# Patient Record
Sex: MALE | Race: ASIAN | Employment: FULL TIME | URBAN - METROPOLITAN AREA
[De-identification: names, ages, dates, MRNs, and addresses within clinical notes are randomized per-mention and may not be internally consistent; named-entity substitution may affect disease eponyms.]

---

## 2017-10-20 ENCOUNTER — HOSPITAL ENCOUNTER (OUTPATIENT)
Dept: DIABETES SERVICES | Age: 37
Discharge: HOME | End: 2017-10-20
Payer: COMMERCIAL

## 2017-10-20 PROCEDURE — G0109 DIAB MANAGE TRN IND/GROUP: HCPCS | Performed by: DIETITIAN, REGISTERED

## 2017-11-02 ENCOUNTER — HOSPITAL ENCOUNTER (OUTPATIENT)
Dept: DIABETES SERVICES | Age: 37
Discharge: HOME | End: 2017-11-02
Payer: COMMERCIAL

## 2017-11-02 DIAGNOSIS — E11.9 DIABETES MELLITUS WITHOUT COMPLICATION (HCC): ICD-10-CM

## 2017-11-02 PROCEDURE — G0109 DIAB MANAGE TRN IND/GROUP: HCPCS | Performed by: DIETITIAN, REGISTERED

## 2017-12-19 ENCOUNTER — HOSPITAL ENCOUNTER (OUTPATIENT)
Dept: DIABETES SERVICES | Age: 37
Discharge: HOME | End: 2017-12-19
Payer: COMMERCIAL

## 2017-12-19 DIAGNOSIS — E11.9 DIABETES MELLITUS WITHOUT COMPLICATION (HCC): ICD-10-CM

## 2017-12-19 PROCEDURE — G0109 DIAB MANAGE TRN IND/GROUP: HCPCS | Performed by: DIETITIAN, REGISTERED

## 2021-12-10 ENCOUNTER — OFFICE VISIT (OUTPATIENT)
Dept: HEMATOLOGY | Age: 41
End: 2021-12-10
Payer: COMMERCIAL

## 2021-12-10 VITALS
RESPIRATION RATE: 16 BRPM | TEMPERATURE: 97.3 F | DIASTOLIC BLOOD PRESSURE: 76 MMHG | OXYGEN SATURATION: 99 % | HEIGHT: 70 IN | HEART RATE: 80 BPM | WEIGHT: 178.8 LBS | SYSTOLIC BLOOD PRESSURE: 115 MMHG | BODY MASS INDEX: 25.6 KG/M2

## 2021-12-10 DIAGNOSIS — R74.8 ELEVATED LIVER ENZYMES: Primary | ICD-10-CM

## 2021-12-10 PROBLEM — G62.9 NEUROPATHY: Status: ACTIVE | Noted: 2021-12-10

## 2021-12-10 PROBLEM — I10 HYPERTENSION: Status: ACTIVE | Noted: 2021-12-10

## 2021-12-10 PROBLEM — E78.00 HYPERCHOLESTEROLEMIA: Status: ACTIVE | Noted: 2021-12-10

## 2021-12-10 PROBLEM — E11.9 TYPE II DIABETES MELLITUS (HCC): Status: ACTIVE | Noted: 2021-12-10

## 2021-12-10 PROCEDURE — 99203 OFFICE O/P NEW LOW 30 MIN: CPT | Performed by: INTERNAL MEDICINE

## 2021-12-10 PROCEDURE — 91200 LIVER ELASTOGRAPHY: CPT | Performed by: INTERNAL MEDICINE

## 2021-12-10 RX ORDER — METFORMIN HYDROCHLORIDE 1000 MG/1
500 TABLET ORAL 2 TIMES DAILY
COMMUNITY
Start: 2021-11-29

## 2021-12-10 RX ORDER — LISINOPRIL 10 MG/1
10 TABLET ORAL DAILY
COMMUNITY
Start: 2021-10-26

## 2021-12-10 RX ORDER — ROSUVASTATIN AND EZETIMIBE 10; 10.4 MG/1; MG/1
TABLET ORAL
COMMUNITY
Start: 2021-11-17 | End: 2021-12-10

## 2021-12-10 RX ORDER — ROSUVASTATIN CALCIUM 10 MG/1
10 TABLET, COATED ORAL DAILY
COMMUNITY
Start: 2021-11-17

## 2021-12-10 RX ORDER — GLIMEPIRIDE 4 MG/1
4 TABLET ORAL DAILY
COMMUNITY
Start: 2021-11-03

## 2021-12-10 RX ORDER — FLASH GLUCOSE SENSOR
KIT MISCELLANEOUS
COMMUNITY
Start: 2021-11-26 | End: 2021-12-10

## 2021-12-10 RX ORDER — FLASH GLUCOSE SENSOR
KIT MISCELLANEOUS AS DIRECTED
COMMUNITY
Start: 2021-11-29

## 2021-12-10 RX ORDER — GABAPENTIN 300 MG/1
300 CAPSULE ORAL 2 TIMES DAILY
COMMUNITY
Start: 2021-11-25

## 2021-12-10 RX ORDER — GLIMEPIRIDE 2 MG/1
TABLET ORAL
COMMUNITY
Start: 2021-11-17 | End: 2021-12-10

## 2021-12-10 NOTE — Clinical Note
12/23/2021    Patient: Mendy Lundberg   YOB: 1980   Date of Visit: 12/10/2021     Aristeo Topete MD  [address] [place] [address]  Via Fax: [phone]    Dear Aristeo Topete MD,      Thank you for referring Mr. Mendy Lundberg to [facility] Ubaldo Rodriguez for evaluation. My notes for this consultation are attached. If you have questions, please do not hesitate to call me. I look forward to following your patient along with you.       Sincerely,    Andria Piña MD

## 2021-12-10 NOTE — PROGRESS NOTES
[address] [facility]      Geri Sharpe MD, Yudith Rocky, Tommie Albright MD, MPH      Jenae Espinoza, VITO Rice, ACN-BC     Olga Pryor, [facility]-BC   Aditya Christiano, FNP-EDDIE Dickerson, [facility]       Josekingsley Long [address]    at [address], [address] [place] [address], [facility] 22.    [phone]    FAX: [address] [place] [address] Drive, [address], [address]    [phone]    FAX: [phone]       Patient Care Team:  None as PCP - Ary Castellano MD (Endocrinology)  Herminia Key MD (Gastroenterology)      Problem List  Date Reviewed: 12/10/2021          Codes Class Noted    Elevated liver enzymes ICD-10-CM: R74.8  ICD-9-CM: 790.5  12/10/2021        Type II diabetes mellitus ([facility] ICD-10-CM: E11.9  ICD-9-CM: 250.00  12/10/2021        Hypercholesterolemia ICD-10-CM: E78.00  ICD-9-CM: 272.0  12/10/2021        Hypertension ICD-10-CM: I10  ICD-9-CM: 401.9  12/10/2021        Neuropathy ICD-10-CM: G62.9  ICD-9-CM: 355.9  12/10/2021              The clinicians listed above have asked me to see Ludin Durham in consultation regarding elevated liver enzymes and its management. All medical records sent by the referring physicians were reviewed including imaging studies     The patient is a 37 y.o. [facility] male who was found to have elevated liver transaminases and alkaline phosphatase in 12/2019. Serologic evaluation for markers of chronic liver disease was negative for HCV, HBV,     Ultrasound of the liver was performed in 12/2021. The results of the imaging are not available at this time. The patient believes this suggested fatty liver disease. Assessment of liver fibrosis with Fibroscan was performed in the office today. The result was 9.3 kPa which correlates with stage 2 fibrosis    The patient does not have any symptoms which could be attributed to the liver disorder. The patient is not experiencing the following symptoms which are commonly seen in this liver disorder:   fatigue,   pain in the right side over the liver,     The patient completes all daily activities without any functional limitations. ASSESSMENT AND PLAN:  Elevated liver enzymes  Persistent elevation in liver transaminases and alkaline phosphatase of unclear etiology at this time. Have performed laboratory testing to monitor liver function and degree of liver injury. This included BMP, hepatic panel, CBC with platelet count, INR. Liver transaminases are elevated. ALP is elevated. Liver function is normal.  The platelet count is normal.      Based upon laboratory studies Fibroscan, and imaging the patient may have significant liver injury. Serologic testing for causes of chronic liver disease was ordered. All was negative     The most likely causes for the liver chemistry abnormalities were discussed with the patient and include fatty liver disease,     The need to perform a liver biopsy to help determine the cause and severity of the liver test abnormalities was discussed. The risks of performing the liver biopsy including pain, puncture of the lung, gallbladder, intestine or kidney and bleeding were discussed. The patient has decided to have a liver biopsy. This will be scheduled. Screening for Hepatocellular Carcinoma  HCC screening is not necessary if the patient has no evidence of cirrhosis. Treatment of other medical problems in patients with chronic liver disease  There are no contraindications for the patient to take most medications that are necessary for treatment of other medical issues.     Counseling for alcohol in patients with chronic liver disease  The patient was counseled regarding alcohol consumption and the effect of alcohol on chronic liver disease. The patient does not consume any significant amount of alcohol. Vaccinations   Vaccination for viral hepatitis A is not needed. The patient has serologic evidence of prior exposure or vaccination with immunity. The patient has received 2 doses of COVID-19 vaccine. Routine vaccinations against other bacterial and viral agents can be performed as indicated. Annual flu vaccination should be administered if indicated. ALLERGIES  Allergies   Allergen Reactions    Semaglutide Other (comments)     bloating       MEDICATIONS  Current Outpatient Medications   Medication Sig    Trulicity 5.38 OC/0.2 mL sub-q pen INJECT 0.75 MG UNDER THE SKIN ONCE A WEEK    gabapentin (NEURONTIN) 300 mg capsule Take 300 mg by mouth two (2) times a day.  flash glucose sensor ([employer] Christie 14 Day Sensor) kit as directed.  glimepiride (AMARYL) 4 mg tablet Take 4 mg by mouth daily.  lisinopriL (PRINIVIL, ZESTRIL) 10 mg tablet Take 10 mg by mouth daily.  metFORMIN (GLUCOPHAGE) 1,000 mg tablet 500 mg two (2) times a day.  rosuvastatin (CRESTOR) 10 mg tablet Take 10 mg by mouth daily. No current facility-administered medications for this visit. SYSTEM REVIEW NOT RELATED TO LIVER DISEASE OR REVIEWED ABOVE:  Constitution systems: Negative for fever, chills, weight gain, weight loss. Eyes: Negative for visual changes. ENT: Negative for sore throat, painful swallowing. Respiratory: Negative for cough, hemoptysis, SOB. Cardiology: Negative for chest pain, palpitations. GI:  Negative for constipation or diarrhea. : Negative for urinary frequency, dysuria, hematuria, nocturia. Skin: Negative for rash. Hematology: Negative for easy bruising, blood clots. Musculo-skelatal: Negative for back pain, muscle pain, weakness. Neurologic: Negative for headaches, dizziness, vertigo, memory problems not related to HE.   Psychology: Negative for anxiety, depression. FAMILY HISTORY:  The father Has/had the following following chronic disease(s): DM, heart issues. .    The mother  of cancer. There is no family history of liver disease. SOCIAL HISTORY:  The patient is . The patient has 3 children,   The patient has never used tobacco products. The patient has never consumed significant amounts of alcohol. The patient currently works full time as software development. PHYSICAL EXAMINATION:  Visit Vitals  /76 (BP 1 Location: Left upper arm, BP Patient Position: Sitting, BP Cuff Size: Adult)   Pulse 80   Temp 97.3 °F (36.3 °C) (Temporal)   Resp 16   Ht 5' 9.6\" (1.768 m)   Wt 178 lb 12.8 oz (81.1 kg)   SpO2 99%   BMI 25.95 kg/m²     General: No acute distress. Eyes: Sclera anicteric. ENT: No oral lesions. Thyroid normal.  Nodes: No adenopathy. Skin: No spider angiomata. No jaundice. No palmar erythema. Respiratory: Lungs clear to auscultation. Cardiovascular: Regular heart rate. No murmurs. No JVD. Abdomen: Soft non-tender. Liver size normal to percussion/palpation. Spleen not palpable. No obvious ascites. Extremities: No edema. No muscle wasting. No gross arthritic changes. Neurologic: Alert and oriented. Cranial nerves grossly intact. No asterixis. LABORATORY STUDIES:  Liver [place] of [address] Units 12/10/2021   WBC 3.4 - 10.8 x10E3/uL 6.6   ANC 1.4 - 7.0 x10E3/uL 3.0   HGB 13.0 - 17.7 g/dL 15.5    - 450 x10E3/uL 284   INR 0.9 - 1.2 1.0   AST 0 - 40 IU/L 53 (H)   ALT 0 - 44 IU/L 89 (H)   Alk Phos 44 - 121 IU/L 125 (H)   Bili, Total 0.0 - 1.2 mg/dL 0.4   Bili, Direct 0.00 - 0.40 mg/dL 0.11   Albumin 4.0 - 5.0 g/dL 4.5   BUN 6 - 24 mg/dL 11   Creat 0.76 - 1.27 mg/dL 0.91   Na 134 - 144 mmol/L 139   K 3.5 - 5.2 mmol/L 5.3 (H)   Cl 96 - 106 mmol/L 101   CO2 20 - 29 mmol/L 26   Glucose 65 - 99 mg/dL 95     SEROLOGIES:  2020.   HBsurface antigen negative, anti-HCV negative    Serologies Latest Ref Rng & Units 12/10/2021   Hep A Ab, Total Negative Positive (A)   Hep B Core Ab, Total Negative Negative   Hep B Surface AB QL  Non Reactive   Ferritin 30 - 400 ng/mL 143   Iron % Saturation 15 - 55 % 25   LORRIE, IFA  Negative   C-ANCA Neg:<1:20 titer <1:20   P-ANCA Neg:<1:20 titer <1:20   ANCA Neg:<1:20 titer <1:20   ASMCA 0 - 19 Units 6   M2 Ab 0.0 - 20.0 Units <20.0   Ceruloplasmin 16.0 - 31.0 mg/dL 24.3   Alpha-1 antitrypsin level 95 - 164 mg/dL 128     LIVER HISTOLOGY:  12/2021. FibroScan performed at [address] EkPa was 9.3. IQR/med 33%. . The results suggested a fibrosis level of F2. The CAP score suggests there is no significant hepatic steatosis. ENDOSCOPIC PROCEDURES:  Not available or performed    RADIOLOGY:  Not available or performed    OTHER TESTING:  Not available or performed    FOLLOW-UP:  All of the issues listed above in the Assessment and Plan were discussed with the patient. All questions were answered. The patient expressed a clear understanding of the above. [address] [address] in 2 weeks after liver biopsy.       Onur Rodriguez MD  [id number], [address] [place] [address] 22.  [phone]  [address] [place] [address]

## 2021-12-10 NOTE — PROGRESS NOTES
Identified pt with two pt identifiers(name and ). Reviewed record in preparation for visit and have obtained necessary documentation. Chief Complaint   Patient presents with    New Patient     Establish care      Vitals:    12/10/21 1145   BP: 115/76   Pulse: 80   Resp: 16   Temp: 97.3 °F (36.3 °C)   TempSrc: Temporal   SpO2: 99%   Weight: 178 lb 12.8 oz (81.1 kg)   Height: 5' 9.6\" (1.768 m)   PainSc:   0 - No pain       Health Maintenance Review: Patient reminded of \"due or due soon\" health maintenance. I have asked the patient to contact his/her primary care provider (PCP) for follow-up on his/her health maintenance. Coordination of Care Questionnaire:  :   1) Have you been to an emergency room, urgent care, or hospitalized since your last visit? If yes, where when, and reason for visit? no       2. Have seen or consulted any other health care provider since your last visit? If yes, where when, and reason for visit? YES, Dr William Jacobs    Patient is accompanied by self I have received verbal consent from Kalina Riddle to discuss any/all medical information while they are present in the room.

## 2021-12-11 LAB
A1AT SERPL-MCNC: 128 MG/DL (ref 95–164)
ACTIN IGG SERPL-ACNC: 6 UNITS (ref 0–19)
ALBUMIN SERPL-MCNC: 4.5 G/DL (ref 4–5)
ALP SERPL-CCNC: 125 IU/L (ref 44–121)
ALT SERPL-CCNC: 89 IU/L (ref 0–44)
AST SERPL-CCNC: 53 IU/L (ref 0–40)
BASOPHILS # BLD AUTO: 0 X10E3/UL (ref 0–0.2)
BASOPHILS NFR BLD AUTO: 1 %
BILIRUB DIRECT SERPL-MCNC: 0.11 MG/DL (ref 0–0.4)
BILIRUB SERPL-MCNC: 0.4 MG/DL (ref 0–1.2)
BUN SERPL-MCNC: 11 MG/DL (ref 6–24)
BUN/CREAT SERPL: 12 (ref 9–20)
CALCIUM SERPL-MCNC: 9.5 MG/DL (ref 8.7–10.2)
CERULOPLASMIN SERPL-MCNC: 24.3 MG/DL (ref 16–31)
CHLORIDE SERPL-SCNC: 101 MMOL/L (ref 96–106)
CHOLEST SERPL-MCNC: 137 MG/DL (ref 100–199)
CO2 SERPL-SCNC: 26 MMOL/L (ref 20–29)
CREAT SERPL-MCNC: 0.91 MG/DL (ref 0.76–1.27)
EOSINOPHIL # BLD AUTO: 0.2 X10E3/UL (ref 0–0.4)
EOSINOPHIL NFR BLD AUTO: 4 %
ERYTHROCYTE [DISTWIDTH] IN BLOOD BY AUTOMATED COUNT: 13.2 % (ref 11.6–15.4)
EST. AVERAGE GLUCOSE BLD GHB EST-MCNC: 169 MG/DL
FERRITIN SERPL-MCNC: 143 NG/ML (ref 30–400)
GLUCOSE SERPL-MCNC: 95 MG/DL (ref 65–99)
HAV AB SER QL IA: POSITIVE
HBA1C MFR BLD: 7.5 % (ref 4.8–5.6)
HBV CORE AB SERPL QL IA: NEGATIVE
HBV SURFACE AB SER QL: NON REACTIVE
HCT VFR BLD AUTO: 47 % (ref 37.5–51)
HDLC SERPL-MCNC: 42 MG/DL
HGB BLD-MCNC: 15.5 G/DL (ref 13–17.7)
IMM GRANULOCYTES # BLD AUTO: 0 X10E3/UL (ref 0–0.1)
IMM GRANULOCYTES NFR BLD AUTO: 1 %
INR PPP: 1 (ref 0.9–1.2)
IRON SATN MFR SERPL: 25 % (ref 15–55)
IRON SERPL-MCNC: 85 UG/DL (ref 38–169)
LDLC SERPL CALC-MCNC: 78 MG/DL (ref 0–99)
LYMPHOCYTES # BLD AUTO: 2.7 X10E3/UL (ref 0.7–3.1)
LYMPHOCYTES NFR BLD AUTO: 40 %
MCH RBC QN AUTO: 27.5 PG (ref 26.6–33)
MCHC RBC AUTO-ENTMCNC: 33 G/DL (ref 31.5–35.7)
MCV RBC AUTO: 84 FL (ref 79–97)
MITOCHONDRIA M2 IGG SER-ACNC: <20 UNITS (ref 0–20)
MONOCYTES # BLD AUTO: 0.7 X10E3/UL (ref 0.1–0.9)
MONOCYTES NFR BLD AUTO: 10 %
NEUTROPHILS # BLD AUTO: 3 X10E3/UL (ref 1.4–7)
NEUTROPHILS NFR BLD AUTO: 44 %
PLATELET # BLD AUTO: 284 X10E3/UL (ref 150–450)
POTASSIUM SERPL-SCNC: 5.3 MMOL/L (ref 3.5–5.2)
PROT SERPL-MCNC: 7.6 G/DL (ref 6–8.5)
PROTHROMBIN TIME: 10.3 SEC (ref 9.1–12)
RBC # BLD AUTO: 5.63 X10E6/UL (ref 4.14–5.8)
SODIUM SERPL-SCNC: 139 MMOL/L (ref 134–144)
TIBC SERPL-MCNC: 341 UG/DL (ref 250–450)
TRIGL SERPL-MCNC: 86 MG/DL (ref 0–149)
UIBC SERPL-MCNC: 256 UG/DL (ref 111–343)
VLDLC SERPL CALC-MCNC: 17 MG/DL (ref 5–40)
WBC # BLD AUTO: 6.6 X10E3/UL (ref 3.4–10.8)

## 2021-12-13 LAB
C-ANCA TITR SER IF: NORMAL TITER
P-ANCA ATYPICAL TITR SER IF: NORMAL TITER
P-ANCA TITR SER IF: NORMAL TITER

## 2021-12-14 LAB — ANA TITR SER IF: NEGATIVE {TITER}

## 2022-01-19 ENCOUNTER — TRANSCRIBE ORDER (OUTPATIENT)
Dept: SCHEDULING | Age: 42
End: 2022-01-19

## 2022-01-19 DIAGNOSIS — R74.8 ELEVATED LIVER ENZYMES: Primary | ICD-10-CM

## 2022-01-21 ENCOUNTER — TRANSCRIBE ORDER (OUTPATIENT)
Dept: REGISTRATION | Age: 42
End: 2022-01-21

## 2022-01-21 ENCOUNTER — HOSPITAL ENCOUNTER (OUTPATIENT)
Dept: PREADMISSION TESTING | Age: 42
Discharge: HOME | End: 2022-01-21
Attending: INTERNAL MEDICINE
Payer: COMMERCIAL

## 2022-01-21 DIAGNOSIS — U07.1 COVID-19: ICD-10-CM

## 2022-01-21 DIAGNOSIS — U07.1 COVID-19: Primary | ICD-10-CM

## 2022-01-21 PROCEDURE — U0005 INFEC AGEN DETEC AMPLI PROBE: HCPCS

## 2022-01-23 LAB
SARS-COV-2, XPLCVT: NOT DETECTED
SOURCE, COVRS: NORMAL

## 2022-01-27 ENCOUNTER — HOSPITAL ENCOUNTER (OUTPATIENT)
Age: 42
Setting detail: OUTPATIENT SURGERY
Discharge: HOME | End: 2022-01-27
Attending: INTERNAL MEDICINE | Admitting: INTERNAL MEDICINE
Payer: COMMERCIAL

## 2022-01-27 ENCOUNTER — APPOINTMENT (OUTPATIENT)
Dept: ULTRASOUND IMAGING | Age: 42
End: 2022-01-27
Attending: INTERNAL MEDICINE
Payer: COMMERCIAL

## 2022-01-27 VITALS
HEIGHT: 70 IN | BODY MASS INDEX: 24.62 KG/M2 | RESPIRATION RATE: 13 BRPM | WEIGHT: 171.96 LBS | DIASTOLIC BLOOD PRESSURE: 91 MMHG | OXYGEN SATURATION: 97 % | TEMPERATURE: 98.7 F | HEART RATE: 76 BPM | SYSTOLIC BLOOD PRESSURE: 136 MMHG

## 2022-01-27 DIAGNOSIS — K76.0 NAFLD (NONALCOHOLIC FATTY LIVER DISEASE): ICD-10-CM

## 2022-01-27 DIAGNOSIS — R74.8 ELEVATED LIVER ENZYMES: ICD-10-CM

## 2022-01-27 PROCEDURE — 77030013826 HC NDL BIOP MAXCOR BARD -B: Performed by: INTERNAL MEDICINE

## 2022-01-27 PROCEDURE — 47000 NEEDLE BIOPSY OF LIVER PERQ: CPT | Performed by: INTERNAL MEDICINE

## 2022-01-27 PROCEDURE — 76942 ECHO GUIDE FOR BIOPSY: CPT | Performed by: INTERNAL MEDICINE

## 2022-01-27 PROCEDURE — 74011000250 HC RX REV CODE- 250: Performed by: INTERNAL MEDICINE

## 2022-01-27 PROCEDURE — 76942 ECHO GUIDE FOR BIOPSY: CPT

## 2022-01-27 PROCEDURE — 88313 SPECIAL STAINS GROUP 2: CPT

## 2022-01-27 PROCEDURE — 88307 TISSUE EXAM BY PATHOLOGIST: CPT

## 2022-01-27 PROCEDURE — 76040000019: Performed by: INTERNAL MEDICINE

## 2022-01-27 PROCEDURE — 77030014115: Performed by: INTERNAL MEDICINE

## 2022-01-27 RX ORDER — SODIUM CHLORIDE 0.9 % (FLUSH) 0.9 %
5-40 SYRINGE (ML) INJECTION EVERY 8 HOURS
Status: DISCONTINUED | OUTPATIENT
Start: 2022-01-27 | End: 2022-01-27 | Stop reason: HOSPADM

## 2022-01-27 RX ORDER — ONDANSETRON 2 MG/ML
4 INJECTION INTRAMUSCULAR; INTRAVENOUS
Status: DISCONTINUED | OUTPATIENT
Start: 2022-01-27 | End: 2022-01-27 | Stop reason: HOSPADM

## 2022-01-27 RX ORDER — FENTANYL CITRATE 50 UG/ML
25 INJECTION, SOLUTION INTRAMUSCULAR; INTRAVENOUS
Status: DISCONTINUED | OUTPATIENT
Start: 2022-01-27 | End: 2022-01-27 | Stop reason: HOSPADM

## 2022-01-27 RX ORDER — LIDOCAINE HYDROCHLORIDE 10 MG/ML
10 INJECTION INFILTRATION; PERINEURAL ONCE
Status: COMPLETED | OUTPATIENT
Start: 2022-01-27 | End: 2022-01-27

## 2022-01-27 RX ORDER — SODIUM CHLORIDE 0.9 % (FLUSH) 0.9 %
5-40 SYRINGE (ML) INJECTION AS NEEDED
Status: DISCONTINUED | OUTPATIENT
Start: 2022-01-27 | End: 2022-01-27 | Stop reason: HOSPADM

## 2022-01-27 NOTE — H&P
[facility], MD, [address], [place], [place]      Mil Crowley, VITO Larson, [facility]     Olga Pryor, [facility]   RACHNA Lockhart, [facility]       Jose Phillips [address]    at [address]    [address] [place] [address], [address] [place] [address], [facility] 22.    [phone]    FAX: [address] [place] [address] Avenue    [address], [address]    [phone]    FAX: [phone]         PRE-PROCEDURE NOTE - LIVER BIOPSY    H and P from last office visit reviewed. Allergies reviewed. Out-patient medication list reviewed. Patient Active Problem List   Diagnosis Code    Elevated liver enzymes R74.8    Type II diabetes mellitus ([facility] E11.9    Hypercholesterolemia E78.00    Hypertension I10    Neuropathy G62.9       Allergies   Allergen Reactions    Semaglutide Other (comments)     bloating       No current facility-administered medications on file prior to encounter. Current Outpatient Medications on File Prior to Encounter   Medication Sig Dispense Refill    Trulicity 4.56 TO/9.9 mL sub-q pen INJECT 0.75 MG UNDER THE SKIN ONCE A WEEK      gabapentin (NEURONTIN) 300 mg capsule Take 300 mg by mouth two (2) times a day.  flash glucose sensor ([employer] Christie 14 Day Sensor) kit as directed.  glimepiride (AMARYL) 4 mg tablet Take 4 mg by mouth daily.  lisinopriL (PRINIVIL, ZESTRIL) 10 mg tablet Take 10 mg by mouth daily.  metFORMIN (GLUCOPHAGE) 1,000 mg tablet 500 mg two (2) times a day.  rosuvastatin (CRESTOR) 10 mg tablet Take 10 mg by mouth daily. For liver biopsy to assess NALFD. The risks of the procedure were discussed with the patient. This included bleeding, pain, and puncture of other organs.   All questions were answered. The patient wishes to proceed with the procedure. The patient was counseled at length about the risks of regina Covid-19 in the chris-operative and post-operative states including the recovery window of their procedure. The patient was made aware that regina Covid-19 after a surgical procedure may worsen their prognosis for recovering from the virus and lend to a higher morbidity and or mortality risk. The patient was given the options of postponing their procedure. All of the risks, benefits, and alternatives were discussed. The patient does  wish to proceed with the procedure. PHYSICAL EXAMINATION:  Visit Vitals  BP (!) 132/91   Pulse 72   Temp 98.9 °F (37.2 °C)   Resp 16   Ht 5' 9.6\" (1.768 m)   Wt 171 lb 15.3 oz (78 kg)   SpO2 99%   BMI 24.96 kg/m²       General: No acute distress. Eyes: Sclera anicteric. ENT: No oral lesions. Thyroid normal.  Nodes: No adenopathy. Skin: No spider angiomata. No jaundice. No palmar erythema. Respiratory: Lungs clear to auscultation. Cardiovascular: Regular heart rate. No murmurs. No JVD. Abdomen: Soft non-tender, liver size normal to percussion/palpation. Spleen not palpable. No obvious ascites. Extremities: No edema. No muscle wasting. No gross arthritic changes. Neurologic: Alert and oriented. Cranial nerves grossly intact. No asterixis. LABS:  Lab Results   Component Value Date/Time    WBC 6.6 12/10/2021 01:01 PM    HGB 15.5 12/10/2021 01:01 PM    HCT 47.0 12/10/2021 01:01 PM    PLATELET 301 87/57/5495 01:01 PM    MCV 84 12/10/2021 01:01 PM     Lab Results   Component Value Date/Time    INR 1.0 12/10/2021 01:01 PM    Prothrombin time 10.3 12/10/2021 01:01 PM       ASSESSMENT AND PLAN:  Liver biopsy under ultrasound guidance.     Olivia Marin MD  [facility] of [address], [address] [place] [address] 22.  [phone]  [address] [place] [address]

## 2022-01-27 NOTE — DISCHARGE INSTRUCTIONS
[facility], [place], [place], [facility], [place]      VITO Centeno, [facility]-BC     April S Roya, [facility]   Jerald Saravia PENNY Stafford, [facility]       Jose Long [facility] 136    at [address] [address] [address] [facility], [address] [address]  22.    [phone]    FAX: [address] [place] [address] [address], [address]    [phone]    FAX: [phone]         LIVER BIOPSY DISCHARGE INSTRUCTIONS      Eliazar Velazquez  1980  Date: 1/27/2022    DIET:    Anjanarebekah Muñoz may resume your previous diet. ACTIVITIES:  Rest quietly the rest of today. You should not lift any objects more than 20 pounds for the next 2 days. If you work sitting down without strenuous activity you may return to work tomorrow. If you exert yourself or do heavy lifting at work you should take tomorrow off. Do not drive or operate hazardous machinery for 12 hours after you are discharged from this procedure. SPECIAL INSTRUCTIONS:  Do not use any aspirin or non-steroidal (Motin, Advil, Naproxen, etc) pain medications for the next 2 days. You may use extra-strength Tylenol (acetaminophen) if you experience pain or discomfort later today. Restarting blood thinners: If you were taking blood thinners prior to the procedure you can restart these in 2 days. Call the [facility] [place] office if you experience any of the following:  Persistent or severe abdominal pain. Persistent or severe abdominal distention. Fever and chills   Nausea and vomiting. New or unusual symptoms. Follow-up care: You should have a follow up appointment with Dr. Rosendo Lucio to review the results of the liver biopsy results in 2 weeks.   If you do not have an appointment please call the office at the number listed above to schedule this. Other instructions: If you have any problems or questions call the [facility] office at the phone number listed above. DISCHARGE SUMMARY from Nurse: The following personal items collected during your admission are returned to you:   Dental Appliance: Dental Appliances: None  Vision: Visual Aid: None  Hearing Aid:    Jewelry:    Clothing:    Other Valuables:    Valuables sent to safe:            Learning About Coronavirus (COVID-19)  Coronavirus (COVID-19): Overview  What is coronavirus ([id number])? The coronavirus disease (COVID-19) is caused by a virus. It is an illness that was first found in [place], [place], in December 2019. It has since spread worldwide. The virus can cause fever, cough, and trouble breathing. In severe cases, it can cause pneumonia and make it hard to breathe without help. It can cause death. Coronaviruses are a large group of viruses. They cause the common cold. They also cause more serious illnesses like Middle East respiratory syndrome (MERS) and severe acute respiratory syndrome (SARS). COVID-19 is caused by a novel coronavirus. That means it's a new type that has not been seen in people before. This virus spreads person-to-person through droplets from coughing and sneezing. It can also spread when you are close to someone who is infected. And it can spread when you touch something that has the virus on it, such as a doorknob or a tabletop. What can you do to protect yourself from coronavirus (COVID-19)? The best way to protect yourself from getting sick is to:  · Avoid areas where there is an outbreak. · Avoid contact with people who may be infected. · Wash your hands often with soap or alcohol-based hand sanitizers. · Avoid crowds and try to stay at least 6 feet away from other people. · Wash your hands often, especially after you cough or sneeze.  Use soap and water, and scrub for at least 20 seconds. If soap and water aren't available, use an alcohol-based hand . · Avoid touching your mouth, nose, and eyes. What can you do to avoid spreading the virus to others? To help avoid spreading the virus to others:  · Cover your mouth with a tissue when you cough or sneeze. Then throw the tissue in the trash. · Use a disinfectant to clean things that you touch often. · Stay home if you are sick or have been exposed to the virus. Don't go to school, work, or public areas. And don't use public transportation. · If you are sick:  ? Leave your home only if you need to get medical care. But call the doctor's office first so they know you're coming. And wear a face mask, if you have one.  ? If you have a face mask, wear it whenever you're around other people. It can help stop the spread of the virus when you cough or sneeze. ? Clean and disinfect your home every day. Use household  and disinfectant wipes or sprays. Take special care to clean things that you grab with your hands. These include doorknobs, remote controls, phones, and handles on your refrigerator and microwave. And don't forget countertops, tabletops, bathrooms, and computer keyboards. When to call for help  Call 911 anytime you think you may need emergency care. For example, call if:  · You have severe trouble breathing. (You can't talk at all.)  · You have constant chest pain or pressure. · You are severely dizzy or lightheaded. · You are confused or can't think clearly. · Your face and lips have a blue color. · You pass out (lose consciousness) or are very hard to wake up. Call your doctor now if you develop symptoms such as:  · Shortness of breath. · Fever. · Cough. If you need to get care, call ahead to the doctor's office for instructions before you go. Make sure you wear a face mask, if you have one, to prevent exposing other people to the virus. Where can you get the latest information?   The following [facility] organizations are tracking and studying this virus. Their websites contain the most up-to-date information. Dong Rinaldi also learn what to do if you think you may have been exposed to the virus. · U.S. Centers for Disease Control and Prevention (CDC): The CDC provides updated news about the disease and travel advice. The website also tells you how to prevent the spread of infection. www.cdc.gov  · World Health Organization [facility]): WHO offers information about the virus outbreaks. WHO also has travel advice. www.who.int  Current as of: April 1, 2020               Content Version: 12.4  © 8979-7570 [employer]orporated. Care instructions adapted under license by your healthcare professional. If you have questions about a medical condition or this instruction, always ask your healthcare professional. [address] any warranty or liability for your use of this information.

## 2022-01-27 NOTE — PROCEDURES
[facility], MD, [address], [place], [place]      VITO Finley, [facility] P[facility]or, [facility]   RACHNA Parikh, [facility]       Jose Long [facility] [address]    at [address], [facility] Jose Kang  22. [phone]    FAX: [address] [place] [address], [address]    [phone]    FAX: [phone]         LIVER BIOPSY PROCEDURE NOTE    [facility]  1980    INDICATIONS/PRE-OPERATIVE  DIAGNOSIS:  NAFLD    : Kristin Pollock MD    SURGICAL ASSISTANT:  None    PROSTHETIC DEVICES, TISSUE GRAFTS, TRANSPLANTED ORGANS:  Not applicable    SEDATION: 1% Lidocaine injection 10 ml    PROCEDURE:  Informed consent to perform the procedure was obtained from the patient. The patient was positioned on the edge of the stretcher lying flat in the supine position. Ultrasound was utilized to image the liver. The diaphragm and any major mass lesion or vascular structures within the liver were identified. An appropriate site for liver biopsy was identified. The distance from the surface of the skin to the liver capsule was 3 cm. This area was prepped with betadine and draped in sterile fashion. The skin was infiltrated with 1% lidocaine. The deeper subcutanous tissues and liver capsule overlying the biopsy site were then infiltrated with 1% lidocaine until appropriate anesthesia was obtained. A small incision was made in the skin so the biopsy devise could be easily inserted. A total of 2 passes with the 16 gauge Bard biopsy devise was then made into the liver. Core(s) of liver tissue totaling 4 cm in length were obtained and placed into tissue fixative.   A band aid was placed over the biopsy site. The patient was then repositioned on the right side and transported to the recovery area on the stretcher for routine monitoring until discharge. The specimen was sent to pathology for processing via the normal transport mechanism. SPECIMEN COLLECTED: Liver    INTERVENTIONS:  None    ESTIMATED BLOOD LOSS: Negligible.      COMPLICATIONS:  None    POST-OPERATIVE DIAGNOSIS: Same as Pre-operative Diagnosis      Jose Munguia MD  [address] 22.  [phone]  [address] [place] [address]

## 2022-02-10 ENCOUNTER — OFFICE VISIT (OUTPATIENT)
Dept: HEMATOLOGY | Age: 42
End: 2022-02-10
Payer: COMMERCIAL

## 2022-02-10 VITALS
TEMPERATURE: 96.8 F | RESPIRATION RATE: 16 BRPM | BODY MASS INDEX: 25.77 KG/M2 | WEIGHT: 180 LBS | SYSTOLIC BLOOD PRESSURE: 112 MMHG | DIASTOLIC BLOOD PRESSURE: 69 MMHG | HEART RATE: 83 BPM | OXYGEN SATURATION: 98 % | HEIGHT: 70 IN

## 2022-02-10 DIAGNOSIS — K75.81 NASH (NONALCOHOLIC STEATOHEPATITIS): Primary | ICD-10-CM

## 2022-02-10 PROCEDURE — 99214 OFFICE O/P EST MOD 30 MIN: CPT | Performed by: INTERNAL MEDICINE

## 2022-02-10 RX ORDER — TADALAFIL 5 MG/1
TABLET ORAL
COMMUNITY
Start: 2022-01-12 | End: 2022-02-27

## 2022-02-10 RX ORDER — TAMSULOSIN HYDROCHLORIDE 0.4 MG/1
CAPSULE ORAL
COMMUNITY
Start: 2021-12-08 | End: 2022-02-27

## 2022-02-10 RX ORDER — OMEPRAZOLE 40 MG/1
40 CAPSULE, DELAYED RELEASE ORAL DAILY
COMMUNITY
Start: 2021-12-08 | End: 2022-02-27

## 2022-02-10 NOTE — PROGRESS NOTES
Identified pt with two pt identifiers(name and ). Reviewed record in preparation for visit and have obtained necessary documentation. Chief Complaint   Patient presents with    Other     LBX  f/u      Vitals:    02/10/22 0804   BP: 112/69   Pulse: 83   Resp: 16   Temp: 96.8 °F (36 °C)   TempSrc: Temporal   SpO2: 98%   Weight: 180 lb (81.6 kg)   Height: 5' 9.6\" (1.768 m)   PainSc:   0 - No pain       Health Maintenance Review: Patient reminded of \"due or due soon\" health maintenance. I have asked the patient to contact his/her primary care provider (PCP) for follow-up on his/her health maintenance. Coordination of Care Questionnaire:  :   1) Have you been to an emergency room, urgent care, or hospitalized since your last visit? If yes, where when, and reason for visit? no       2. Have seen or consulted any other health care provider since your last visit? If yes, where when, and reason for visit? NO      Patient is accompanied by self I have received verbal consent from Yamile Sales to discuss any/all medical information while they are present in the room.

## 2022-02-10 NOTE — PROGRESS NOTES
Clemente Daniels [address]      Binta Bills MD, Tangela Persaud, Ventura Schafer MD, MPH      [place], PA-EDDIE Stafford, [facility]-BC     Olga Pryor, [facility]   Digna Bonilla GALO-EDDIE Brambila, [facility]       Jose Mois[facility] [address]    at [address], [facility] Jose Kang  22.    [phone]    FAX: [address] [place] [address], [address]    [phone]    FAX: [phone]       Patient Care Team:  None as PCP - Naya Tom MD (Endocrinology)  Lizzy Yu MD (Gastroenterology)      Problem List  Date Reviewed: 2/27/2022          Codes Class Noted    MORALES (nonalcoholic steatohepatitis) ICD-10-CM: K75.81  ICD-9-CM: 571.8  2/27/2022        Type II diabetes mellitus ([facility] ICD-10-CM: E11.9  ICD-9-CM: 250.00  12/10/2021        Hypercholesterolemia ICD-10-CM: E78.00  ICD-9-CM: 272.0  12/10/2021        Hypertension ICD-10-CM: I10  ICD-9-CM: 401.9  12/10/2021        Neuropathy ICD-10-CM: G62.9  ICD-9-CM: 355.9  12/10/2021              Beau Black is being seen at [address] for management of non-alcoholic steatohepatitis (MORALES). The active problem list, all pertinent past medical history, medications, liver histology, radiologic findings and laboratory findings related to the liver disorder were reviewed and discussed with the patient. The patient is a 37 y.o. [place] ([facility]) male who was found to have elevated liver transaminases and alkaline phosphatase in 12/2019. Serologic evaluation for markers of chronic liver disease was negative     The patient underwent a liver biopsy in 2/2022. The procedure was well tolerated. I have personally reviewed and interpreted the liver biopsy slides. This demonstrates MORALES with stage 2 fibrosis. The patient does not have any symptoms which could be attributed to the liver disorder. The patient is not experiencing the following symptoms which are commonly seen in this liver disorder:   fatigue,   pain in the right side over the liver,     The patient completes all daily activities without any functional limitations. ASSESSMENT AND PLAN:  MORALES  The diagnosis is based upon liver biopsy, Fiboscan CAP score, features of metabolic syndrome, serologic studies that are negative for other causes of chronic liver disease,     A liver biopsy performed in 2/2022 demonstrates moderate steatosis, moderate inflammation, mild ballooning, stage 2 fibrosis. Fibroscan in 12/2021 demonstrated  9.3 kPa and  suggesting stage 2 fibrosis. Liver transaminases are elevated. ALP is elevated. Liver function is normal.  The platelet count is normal.      The patient has a normal or near normal BMI and can not possibly loose sufficient weight to resolve NAFLD. There is currently no FDA approved medical treatment for fatty liver, NALFD or MORALES. The only medical treatments for MORALES are though clinical trials. The patient would like to participate in a clinical trial.    Screening for Hepatocellular Carcinoma  HCC screening is not necessary if the patient has no evidence of cirrhosis. Treatment of other medical problems in patients with chronic liver disease  There are no contraindications for the patient to take most medications that are necessary for treatment of other medical issues. Counseling for alcohol in patients with chronic liver disease  The patient was counseled regarding alcohol consumption and the effect of alcohol on chronic liver disease. The patient does not consume any significant amount of alcohol. Vaccinations   Vaccination for viral hepatitis A is not needed.   The patient has serologic evidence of prior exposure or vaccination with immunity. The patient has received 2 doses of COVID-19 vaccine. Routine vaccinations against other bacterial and viral agents can be performed as indicated. Annual flu vaccination should be administered if indicated. ALLERGIES  Allergies   Allergen Reactions    Semaglutide Other (comments)     bloating       MEDICATIONS  Current Outpatient Medications   Medication Sig    Trulicity 7.00 RV/5.0 mL sub-q pen INJECT 0.75 MG UNDER THE SKIN ONCE A WEEK    gabapentin (NEURONTIN) 300 mg capsule Take 300 mg by mouth two (2) times a day.  flash glucose sensor ([employer]upi[employer]Style Christie 14 Day Sensor) kit as directed.  glimepiride (AMARYL) 4 mg tablet Take 4 mg by mouth daily.  lisinopriL (PRINIVIL, ZESTRIL) 10 mg tablet Take 10 mg by mouth daily.  metFORMIN (GLUCOPHAGE) 1,000 mg tablet 500 mg two (2) times a day.  rosuvastatin (CRESTOR) 10 mg tablet Take 10 mg by mouth daily.  omeprazole (PRILOSEC) 40 mg capsule Take 40 mg by mouth daily. (Patient not taking: Reported on 2/10/2022)    tadalafiL (CIALIS) 5 mg tablet  (Patient not taking: Reported on 2/10/2022)    tamsulosin (FLOMAX) 0.4 mg capsule TAKE 1 CAPSULE BY MOUTH EVERY EVENING (Patient not taking: Reported on 2/10/2022)     No current facility-administered medications for this visit. SYSTEM REVIEW NOT RELATED TO LIVER DISEASE OR REVIEWED ABOVE:  Constitution systems: Negative for fever, chills, weight gain, weight loss. Eyes: Negative for visual changes. ENT: Negative for sore throat, painful swallowing. Respiratory: Negative for cough, hemoptysis, SOB. Cardiology: Negative for chest pain, palpitations. GI:  Negative for constipation or diarrhea. : Negative for urinary frequency, dysuria, hematuria, nocturia. Skin: Negative for rash. Hematology: Negative for easy bruising, blood clots. Musculo-skelatal: Negative for back pain, muscle pain, weakness.   Neurologic: Negative for headaches, dizziness, vertigo, memory problems not related to HE. Psychology: Negative for anxiety, depression. FAMILY HISTORY:  The father Has/had the following following chronic disease(s): DM, heart issues. .    The mother  of cancer. There is no family history of liver disease. SOCIAL HISTORY:  The patient is . The patient has 3 children,   The patient has never used tobacco products. The patient has never consumed significant amounts of alcohol. The patient currently works full time as software development. PHYSICAL EXAMINATION:  Visit Vitals  /69 (BP 1 Location: Left upper arm, BP Patient Position: Sitting, BP Cuff Size: Adult)   Pulse 83   Temp 96.8 °F (36 °C) (Temporal)   Resp 16   Ht 5' 9.6\" (1.768 m)   Wt 180 lb (81.6 kg)   SpO2 98%   BMI 26.13 kg/m²     General: No acute distress. Eyes: Sclera anicteric. ENT: No oral lesions. Thyroid normal.  Nodes: No adenopathy. Skin: No spider angiomata. No jaundice. No palmar erythema. Respiratory: Lungs clear to auscultation. Cardiovascular: Regular heart rate. No murmurs. No JVD. Abdomen: Soft non-tender. Liver size normal to percussion/palpation. Spleen not palpable. No obvious ascites. Extremities: No edema. No muscle wasting. No gross arthritic changes. Neurologic: Alert and oriented. Cranial nerves grossly intact. No asterixis. LABORATORY STUDIES:  Liver [place] of [address] Units 12/10/2021   WBC 3.4 - 10.8 x10E3/uL 6.6   ANC 1.4 - 7.0 x10E3/uL 3.0   HGB 13.0 - 17.7 g/dL 15.5    - 450 x10E3/uL 284   INR 0.9 - 1.2 1.0   AST 0 - 40 IU/L 53 (H)   ALT 0 - 44 IU/L 89 (H)   Alk Phos 44 - 121 IU/L 125 (H)   Bili, Total 0.0 - 1.2 mg/dL 0.4   Bili, Direct 0.00 - 0.40 mg/dL 0.11   Albumin 4.0 - 5.0 g/dL 4.5   BUN 6 - 24 mg/dL 11   Creat 0.76 - 1.27 mg/dL 0.91   Na 134 - 144 mmol/L 139   K 3.5 - 5.2 mmol/L 5.3 (H)   Cl 96 - 106 mmol/L 101   CO2 20 - 29 mmol/L 26   Glucose 65 - 99 mg/dL 95     SEROLOGIES:  2020. HBsurface antigen negative, anti-HCV negative    Serologies Latest Ref Rng & Units 12/10/2021   Hep A Ab, Total Negative Positive (A)   Hep B Core Ab, Total Negative Negative   Hep B Surface AB QL  Non Reactive   Ferritin 30 - 400 ng/mL 143   Iron % Saturation 15 - 55 % 25   LORRIE, IFA  Negative   C-ANCA Neg:<1:20 titer <1:20   P-ANCA Neg:<1:20 titer <1:20   ANCA Neg:<1:20 titer <1:20   ASMCA 0 - 19 Units 6   M2 Ab 0.0 - 20.0 Units <20.0   Ceruloplasmin 16.0 - 31.0 mg/dL 24.3   Alpha-1 antitrypsin level 95 - 164 mg/dL 128     LIVER HISTOLOGY:  12/2021. [facility]. EkPa was 9.3. IQR/med 33%. . The results suggested a fibrosis level of F2. The CAP score suggests there is no significant hepatic steatosis. 2/2022. Slides reviewed by MLS. MORALES. 50-66% macrovesicualr and micovesicular steatosis, moderate inflammation, mild ballooning, Stage 2 fibrosis. ESDRAS (221). ENDOSCOPIC PROCEDURES:  Not available or performed    RADIOLOGY:  Not available or performed    OTHER TESTING:  Not available or performed    FOLLOW-UP:  All of the issues listed above in the Assessment and Plan were discussed with the patient. All questions were answered. The patient expressed a clear understanding of the above. [address] [place] [address] in 2-4 weeks for screening and enrollment into a clinical trial for treatment of MORALES.   The patient was given a follow-up appointment for 4 months in case she decides not to enter or is excluded from entering the clinical trial.      Gretchen Torres MD  [facility] of [address], [address] [facility], [facility] 22.  [facility]

## 2022-02-27 PROBLEM — R74.8 ELEVATED LIVER ENZYMES: Status: RESOLVED | Noted: 2021-12-10 | Resolved: 2022-02-27

## 2022-02-27 PROBLEM — K75.81 NASH (NONALCOHOLIC STEATOHEPATITIS): Status: ACTIVE | Noted: 2022-02-27

## 2022-03-03 ENCOUNTER — DOCUMENTATION ONLY (OUTPATIENT)
Dept: HEMATOLOGY | Age: 42
End: 2022-03-03

## 2022-03-03 NOTE — PROGRESS NOTES
Chart reviewed today for possible consideration in MORALES clinical trials. Pt has a documented allergy to semaglutide therefore [employer] is not an option. Will need to clarify start date for Trulicity (must be before 12 Aug 2021 to use biopsy 27 Jan 2022 for [place] 11) and Trulicity must have started with stable dose for 6mo before screening for Sagimet. Also, need to confirm pt willingness to have MRI for either study. Left voicemail on mobile number requesting a return call to discuss the above.

## 2022-03-18 PROBLEM — E78.00 HYPERCHOLESTEROLEMIA: Status: ACTIVE | Noted: 2021-12-10

## 2022-03-18 PROBLEM — I10 HYPERTENSION: Status: ACTIVE | Noted: 2021-12-10

## 2022-03-19 PROBLEM — G62.9 NEUROPATHY: Status: ACTIVE | Noted: 2021-12-10

## 2022-03-19 PROBLEM — K75.81 NASH (NONALCOHOLIC STEATOHEPATITIS): Status: ACTIVE | Noted: 2022-02-27

## 2022-03-20 PROBLEM — E11.9 TYPE II DIABETES MELLITUS (HCC): Status: ACTIVE | Noted: 2021-12-10

## 2022-08-01 ENCOUNTER — TELEPHONE (OUTPATIENT)
Dept: HEMATOLOGY | Age: 42
End: 2022-08-01

## 2022-08-01 NOTE — TELEPHONE ENCOUNTER
1st atttempt - Tried to call patient to reschule his 8/11/22 appt with [facility] but his mailbox was full. I LM on his wife's number to callback.   Per CFW he needs to re rs with MLS in October

## 2022-10-07 ENCOUNTER — OFFICE VISIT (OUTPATIENT)
Dept: HEMATOLOGY | Age: 42
End: 2022-10-07
Payer: COMMERCIAL

## 2022-10-07 VITALS
OXYGEN SATURATION: 100 % | TEMPERATURE: 97.1 F | HEART RATE: 79 BPM | DIASTOLIC BLOOD PRESSURE: 79 MMHG | HEIGHT: 70 IN | WEIGHT: 178 LBS | BODY MASS INDEX: 25.48 KG/M2 | RESPIRATION RATE: 16 BRPM | SYSTOLIC BLOOD PRESSURE: 117 MMHG

## 2022-10-07 DIAGNOSIS — K76.0 NAFLD (NONALCOHOLIC FATTY LIVER DISEASE): Primary | ICD-10-CM

## 2022-10-07 PROCEDURE — 3074F SYST BP LT 130 MM HG: CPT | Performed by: INTERNAL MEDICINE

## 2022-10-07 PROCEDURE — 91200 LIVER ELASTOGRAPHY: CPT | Performed by: INTERNAL MEDICINE

## 2022-10-07 PROCEDURE — 99214 OFFICE O/P EST MOD 30 MIN: CPT | Performed by: INTERNAL MEDICINE

## 2022-10-07 PROCEDURE — 3078F DIAST BP <80 MM HG: CPT | Performed by: INTERNAL MEDICINE

## 2022-10-07 NOTE — PROGRESS NOTES
[address] [facility], MD, [address], [place], [place]      VITO Braxton, [facility]   Evangelina Benedict, [facility]   Gaila Prader, FNP-C   Brennen Adames, FNP-C    Zay Faria, [facility]       Jose Long Alan [address]    at [address], [facility] Jose Kang  22.    [phone]    FAX: [address] [place] [address], [address]    [phone]    FAX: [phone]     Patient Care Team:  None as PCP - Raphael Mcguire MD (Endocrinology Physician)  Olga Turner MD (Gastroenterology)      Problem List  Date Reviewed: 2/27/2022            Codes Class Noted    MORALES (nonalcoholic steatohepatitis) ICD-10-CM: K75.81  ICD-9-CM: 571.8  2/27/2022        Type [facility] diabetes mellitus ([facility] ICD-10-CM: E11.9  ICD-9-CM: 250.00  12/10/2021        Hypercholesterolemia ICD-10-CM: E78.00  ICD-9-CM: 272.0  12/10/2021        Hypertension ICD-10-CM: I10  ICD-9-CM: 401.9  12/10/2021        Neuropathy ICD-10-CM: G62.9  ICD-9-CM: 355.9  12/10/2021           Jacob Voss [facility] for management of non-alcoholic steatohepatitis (MORALES). The active problem list, all pertinent past medical history, medications, liver histology, radiologic findings and laboratory findings related to the liver disorder were reviewed and discussed with the patient. The patient is a 40 y.o. [place] ([facility]) male who was found to have elevated liver transaminases and alkaline phosphatase in 12/2019. Serologic evaluation for markers of chronic liver disease was negative     Liver biopsy in 2/2022 demonstrates MORALES with stage 2 fibrosis. Assessment of liver fibrosis with Fibroscan was performed in the office today.   The result was 7.0 kPa which correlates with stage 1 fibrosis. The patient does not have any symptoms which could be attributed to the liver disorder. The patient is not experiencing the following symptoms which are commonly seen in this liver disorder:   fatigue,   pain in the right side over the liver,     The patient completes all daily activities without any functional limitations. ASSESSMENT AND PLAN:  MORALES  The diagnosis is based upon liver biopsy, Fiboscan CAP score, features of metabolic syndrome, serologic studies that are negative for other causes of chronic liver disease,     A liver biopsy performed in 2/2022 demonstrates moderate steatosis, moderate inflammation, mild ballooning, stage 2 fibrosis. Fibroscan in 12/2021 demonstrated  9.3 kPa and  suggesting stage 2 fibrosis. Liver transaminases are elevated. ALP is elevated. Liver function is normal.  The platelet count is normal.      The patient has a normal or near normal BMI and can not possibly loose sufficient weight to resolve NAFLD. There is currently no FDA approved medical treatment for fatty liver, NALFD or MORALES. The only medical treatments for MORALES are though clinical trials. The patient would like to participate in a clinical trial.    Screening for Hepatocellular Carcinoma  HCC screening is not necessary if the patient has no evidence of cirrhosis. Treatment of other medical problems in patients with chronic liver disease  There are no contraindications for the patient to take most medications that are necessary for treatment of other medical issues. Counseling for alcohol in patients with chronic liver disease  The patient was counseled regarding alcohol consumption and the effect of alcohol on chronic liver disease. The patient does not consume any significant amount of alcohol. Vaccinations   Vaccination for viral hepatitis A is not needed.   The patient has serologic evidence of prior exposure or vaccination with immunity. The patient has received 2 doses of COVID-19 vaccine. Routine vaccinations against other bacterial and viral agents can be performed as indicated. Annual flu vaccination should be administered if indicated. ALLERGIES  Allergies   Allergen Reactions    Semaglutide Other (comments)     bloating       MEDICATIONS  Current Outpatient Medications   Medication Sig    dulaglutide (TRULICITY) 1.5 AU/9.3 mL sub-q pen 1.5 mg by SubCUTAneous route every seven (7) days. gabapentin (NEURONTIN) 300 mg capsule Take 300 mg by mouth two (2) times a day. flash glucose sensor (FreeStyle Christie 14 Day Sensor) kit as directed. glimepiride (AMARYL) 4 mg tablet Take 4 mg by mouth daily. lisinopriL (PRINIVIL, ZESTRIL) 10 mg tablet Take 10 mg by mouth daily. metFORMIN (GLUCOPHAGE) 1,000 mg tablet 500 mg two (2) times a day. rosuvastatin (CRESTOR) 10 mg tablet Take 10 mg by mouth daily. No current facility-administered medications for this visit. SYSTEM REVIEW NOT RELATED TO LIVER DISEASE OR REVIEWED ABOVE:  Constitution systems: Negative for fever, chills, weight gain, weight loss. Eyes: Negative for visual changes. ENT: Negative for sore throat, painful swallowing. Respiratory: Negative for cough, hemoptysis, SOB. Cardiology: Negative for chest pain, palpitations. GI:  Negative for constipation or diarrhea. : Negative for urinary frequency, dysuria, hematuria, nocturia. Skin: Negative for rash. Hematology: Negative for easy bruising, blood clots. Musculo-skelatal: Negative for back pain, muscle pain, weakness. Neurologic: Negative for headaches, dizziness, vertigo, memory problems not related to HE. Psychology: Negative for anxiety, depression. FAMILY HISTORY:  The father Has/had the following following chronic disease(s): DM, heart issues. .    The mother  of cancer. There is no family history of liver disease.       SOCIAL HISTORY:  The patient is . The patient has 3 children,   The patient has never used tobacco products. The patient has never consumed significant amounts of alcohol. The patient currently works full time as software development. PHYSICAL EXAMINATION:  Visit Vitals  /79 (BP 1 Location: Left upper arm, BP Patient Position: Sitting, BP Cuff Size: Adult)   Pulse 79   Temp 97.1 °F (36.2 °C) (Temporal)   Resp 16   Ht 5' 9.6\" (1.768 m)   Wt 178 lb (80.7 kg)   SpO2 100%   BMI 25.83 kg/m²     General: No acute distress. Eyes: Sclera anicteric. ENT: No oral lesions. Thyroid normal.  Nodes: No adenopathy. Skin: No spider angiomata. No jaundice. No palmar erythema. Respiratory: Lungs clear to auscultation. Cardiovascular: Regular heart rate. No murmurs. No JVD. Abdomen: Soft non-tender. Liver size normal to percussion/palpation. Spleen not palpable. No obvious ascites. Extremities: No edema. No muscle wasting. No gross arthritic changes. Neurologic: Alert and oriented. Cranial nerves grossly intact. No asterixis. LABORATORY STUDIES:  Liver [place] of [address] Units 12/10/2021   WBC 3.4 - 10.8 x10E3/uL 6.6   ANC 1.4 - 7.0 x10E3/uL 3.0   HGB 13.0 - 17.7 g/dL 15.5    - 450 x10E3/uL 284   INR 0.9 - 1.2 1.0   AST 0 - 40 IU/L 53 (H)   ALT 0 - 44 IU/L 89 (H)   Alk Phos 44 - 121 IU/L 125 (H)   Bili, Total 0.0 - 1.2 mg/dL 0.4   Bili, Direct 0.00 - 0.40 mg/dL 0.11   Albumin 4.0 - 5.0 g/dL 4.5   BUN 6 - 24 mg/dL 11   Creat 0.76 - 1.27 mg/dL 0.91   Na 134 - 144 mmol/L 139   K 3.5 - 5.2 mmol/L 5.3 (H)   Cl 96 - 106 mmol/L 101   CO2 20 - 29 mmol/L 26   Glucose 65 - 99 mg/dL 95     SEROLOGIES:  1/2020.   HBsurface antigen negative, anti-HCV negative    Serologies Latest Ref Rng & Units 12/10/2021   Hep A Ab, Total Negative Positive (A)   Hep B Core Ab, Total Negative Negative   Hep B Surface AB QL  Non Reactive   Ferritin 30 - 400 ng/mL 143   Iron % Saturation 15 - 55 % 25 LORRIE, IFA  Negative   C-ANCA Neg:<1:20 titer <1:20   P-ANCA Neg:<1:20 titer <1:20   ANCA Neg:<1:20 titer <1:20   ASMCA 0 - 19 Units 6   M2 Ab 0.0 - 20.0 Units <20.0   Ceruloplasmin 16.0 - 31.0 mg/dL 24.3   Alpha-1 antitrypsin level 95 - 164 mg/dL 128     LIVER HISTOLOGY:  12/2021. Fib[facility]Scan performed at [facility]. EkPa was 9.3. IQR/med 33%. . The results suggested a fibrosis level of F2. The CAP score suggests there is no significant hepatic steatosis. 2/2022. Slides reviewed by MLS. MORALES. 50-66% macrovesicualr and micovesicular steatosis, moderate inflammation, mild ballooning, Stage 2 fibrosis. ESDRAS (221). 10/2022. FibroScan performed at [facility]. EkPa was 7.0. IQR/med 3%. . The results suggested a fibrosis level of F1. The CAP score suggests there is no significant hepatic steatosis. ENDOSCOPIC PROCEDURES:  Not available or performed    RADIOLOGY:  Not available or performed    OTHER TESTING:  Not available or performed    FOLLOW-UP:  All of the issues listed above in the Assessment and Plan were discussed with the patient. All questions were answered. The patient expressed a clear understanding of the above. [address] [place] [address] in 2-4 weeks for screening and enrollment into a clinical trial for treatment of MORALES.   The patient was given a follow-up appointment for 4 months in case she decides not to enter or is excluded from entering the clinical trial.      MD Lou Escoto [address] [place] [address]  [address] [facility] 22. [phone]  FAX:  [address] [place]

## 2022-10-07 NOTE — PROGRESS NOTES
Identified pt with two pt identifiers(name and ). Reviewed record in preparation for visit and have obtained necessary documentation. Chief Complaint   Patient presents with    Fatty Liver     6 mo Fibroscan f/u      Vitals:    10/07/22 1431   BP: 117/79   Pulse: 79   Resp: 16   Temp: 97.1 °F (36.2 °C)   TempSrc: Temporal   SpO2: 100%   Weight: 178 lb (80.7 kg)   Height: 5' 9.6\" (1.768 m)   PainSc:   0 - No pain       Health Maintenance Review: Patient reminded of \"due or due soon\" health maintenance. I have asked the patient to contact his/her primary care provider (PCP) for follow-up on his/her health maintenance. Coordination of Care Questionnaire:  :   1) Have you been to an emergency room, urgent care, or hospitalized since your last visit? If yes, where when, and reason for visit? no       2. Have seen or consulted any other health care provider since your last visit? If yes, where when, and reason for visit? NO      Patient is accompanied by self I have received verbal consent from Jaida Allen to discuss any/all medical information while they are present in the room.

## 2022-10-08 LAB
ALBUMIN SERPL-MCNC: 4.8 G/DL (ref 4–5)
ALP SERPL-CCNC: 132 IU/L (ref 44–121)
ALT SERPL-CCNC: 57 IU/L (ref 0–44)
AST SERPL-CCNC: 27 IU/L (ref 0–40)
BASOPHILS # BLD AUTO: 0 X10E3/UL (ref 0–0.2)
BASOPHILS NFR BLD AUTO: 1 %
BILIRUB DIRECT SERPL-MCNC: 0.11 MG/DL (ref 0–0.4)
BILIRUB SERPL-MCNC: 0.3 MG/DL (ref 0–1.2)
BUN SERPL-MCNC: 12 MG/DL (ref 6–24)
BUN/CREAT SERPL: 13 (ref 9–20)
CALCIUM SERPL-MCNC: 9.7 MG/DL (ref 8.7–10.2)
CHLORIDE SERPL-SCNC: 100 MMOL/L (ref 96–106)
CO2 SERPL-SCNC: 23 MMOL/L (ref 20–29)
CREAT SERPL-MCNC: 0.94 MG/DL (ref 0.76–1.27)
EGFR: 104 ML/MIN/1.73
EOSINOPHIL # BLD AUTO: 0.3 X10E3/UL (ref 0–0.4)
EOSINOPHIL NFR BLD AUTO: 4 %
ERYTHROCYTE [DISTWIDTH] IN BLOOD BY AUTOMATED COUNT: 13.5 % (ref 11.6–15.4)
GLUCOSE SERPL-MCNC: 86 MG/DL (ref 70–99)
HCT VFR BLD AUTO: 49.5 % (ref 37.5–51)
HGB BLD-MCNC: 16.2 G/DL (ref 13–17.7)
IMM GRANULOCYTES # BLD AUTO: 0 X10E3/UL (ref 0–0.1)
IMM GRANULOCYTES NFR BLD AUTO: 0 %
LYMPHOCYTES # BLD AUTO: 4 X10E3/UL (ref 0.7–3.1)
LYMPHOCYTES NFR BLD AUTO: 45 %
MCH RBC QN AUTO: 27.5 PG (ref 26.6–33)
MCHC RBC AUTO-ENTMCNC: 32.7 G/DL (ref 31.5–35.7)
MCV RBC AUTO: 84 FL (ref 79–97)
MONOCYTES # BLD AUTO: 0.8 X10E3/UL (ref 0.1–0.9)
MONOCYTES NFR BLD AUTO: 10 %
NEUTROPHILS # BLD AUTO: 3.4 X10E3/UL (ref 1.4–7)
NEUTROPHILS NFR BLD AUTO: 40 %
PLATELET # BLD AUTO: 288 X10E3/UL (ref 150–450)
POTASSIUM SERPL-SCNC: 5.3 MMOL/L (ref 3.5–5.2)
PROT SERPL-MCNC: 7.6 G/DL (ref 6–8.5)
RBC # BLD AUTO: 5.9 X10E6/UL (ref 4.14–5.8)
SODIUM SERPL-SCNC: 140 MMOL/L (ref 134–144)
WBC # BLD AUTO: 8.7 X10E3/UL (ref 3.4–10.8)

## 2022-11-29 ENCOUNTER — DOCUMENTATION ONLY (OUTPATIENT)
Dept: HEMATOLOGY | Age: 42
End: 2022-11-29

## 2022-11-29 NOTE — PROGRESS NOTES
Chart Reviewed for patient's eligibility for Terns Clinical Trial. Pt appears to qualify for Screening at this time. Will contact patient once screening opens at our site.

## 2023-05-17 RX ORDER — LISINOPRIL 10 MG/1
10 TABLET ORAL DAILY
COMMUNITY
Start: 2021-10-26

## 2023-05-17 RX ORDER — GLIMEPIRIDE 4 MG/1
4 TABLET ORAL DAILY
COMMUNITY
Start: 2021-11-03

## 2023-05-17 RX ORDER — GABAPENTIN 300 MG/1
300 CAPSULE ORAL 2 TIMES DAILY
COMMUNITY
Start: 2021-11-25

## 2023-05-17 RX ORDER — ROSUVASTATIN CALCIUM 10 MG/1
10 TABLET, COATED ORAL DAILY
COMMUNITY
Start: 2021-11-17

## 2023-06-30 ENCOUNTER — APPOINTMENT (OUTPATIENT)
Facility: HOSPITAL | Age: 43
End: 2023-06-30
Payer: COMMERCIAL

## 2023-06-30 ENCOUNTER — HOSPITAL ENCOUNTER (EMERGENCY)
Facility: HOSPITAL | Age: 43
Discharge: HOME | End: 2023-06-30
Attending: EMERGENCY MEDICINE
Payer: COMMERCIAL

## 2023-06-30 VITALS
WEIGHT: 178 LBS | HEART RATE: 77 BPM | BODY MASS INDEX: 26.36 KG/M2 | TEMPERATURE: 98.4 F | DIASTOLIC BLOOD PRESSURE: 77 MMHG | OXYGEN SATURATION: 99 % | RESPIRATION RATE: 17 BRPM | SYSTOLIC BLOOD PRESSURE: 113 MMHG | HEIGHT: 69 IN

## 2023-06-30 DIAGNOSIS — R07.9 CHEST PAIN, UNSPECIFIED TYPE: Primary | ICD-10-CM

## 2023-06-30 LAB
ALBUMIN SERPL-MCNC: 4 G/DL (ref 3.5–5)
ALBUMIN/GLOB SERPL: 1.1 (ref 1.1–2.2)
ALP SERPL-CCNC: 119 U/L (ref 45–117)
ALT SERPL-CCNC: 41 U/L (ref 12–78)
ANION GAP SERPL CALC-SCNC: 8 MMOL/L (ref 5–15)
AST SERPL-CCNC: 25 U/L (ref 15–37)
BASOPHILS # BLD: 0 K/UL (ref 0–0.1)
BASOPHILS NFR BLD: 1 % (ref 0–1)
BILIRUB SERPL-MCNC: 0.3 MG/DL (ref 0.2–1)
BUN SERPL-MCNC: 13 MG/DL (ref 6–20)
BUN/CREAT SERPL: 13 (ref 12–20)
CALCIUM SERPL-MCNC: 9.4 MG/DL (ref 8.5–10.1)
CHLORIDE SERPL-SCNC: 104 MMOL/L (ref 97–108)
CO2 SERPL-SCNC: 29 MMOL/L (ref 21–32)
CREAT SERPL-MCNC: 0.99 MG/DL (ref 0.7–1.3)
D DIMER PPP FEU-MCNC: <0.19 MG/L FEU (ref 0–0.65)
DIFFERENTIAL METHOD BLD: NORMAL
EKG ATRIAL RATE: 68 BPM
EKG ATRIAL RATE: 70 BPM
EKG DIAGNOSIS: NORMAL
EKG DIAGNOSIS: NORMAL
EKG P AXIS: 42 DEGREES
EKG P AXIS: 50 DEGREES
EKG P-R INTERVAL: 180 MS
EKG P-R INTERVAL: 186 MS
EKG Q-T INTERVAL: 366 MS
EKG Q-T INTERVAL: 380 MS
EKG QRS DURATION: 80 MS
EKG QRS DURATION: 84 MS
EKG QTC CALCULATION (BAZETT): 395 MS
EKG QTC CALCULATION (BAZETT): 404 MS
EKG R AXIS: 37 DEGREES
EKG R AXIS: 40 DEGREES
EKG T AXIS: 50 DEGREES
EKG T AXIS: 50 DEGREES
EKG VENTRICULAR RATE: 68 BPM
EKG VENTRICULAR RATE: 70 BPM
EOSINOPHIL # BLD: 0.4 K/UL (ref 0–0.4)
EOSINOPHIL NFR BLD: 6 % (ref 0–7)
ERYTHROCYTE [DISTWIDTH] IN BLOOD BY AUTOMATED COUNT: 13.2 % (ref 11.5–14.5)
GLOBULIN SER CALC-MCNC: 3.8 G/DL (ref 2–4)
GLUCOSE SERPL-MCNC: 99 MG/DL (ref 65–100)
HCT VFR BLD AUTO: 46.4 % (ref 36.6–50.3)
HGB BLD-MCNC: 14.9 G/DL (ref 12.1–17)
IMM GRANULOCYTES # BLD AUTO: 0 K/UL (ref 0–0.04)
IMM GRANULOCYTES NFR BLD AUTO: 0 % (ref 0–0.5)
LYMPHOCYTES # BLD: 2.9 K/UL (ref 0.8–3.5)
LYMPHOCYTES NFR BLD: 43 % (ref 12–49)
MCH RBC QN AUTO: 27.8 PG (ref 26–34)
MCHC RBC AUTO-ENTMCNC: 32.1 G/DL (ref 30–36.5)
MCV RBC AUTO: 86.6 FL (ref 80–99)
MONOCYTES # BLD: 0.7 K/UL (ref 0–1)
MONOCYTES NFR BLD: 10 % (ref 5–13)
NEUTS SEG # BLD: 2.8 K/UL (ref 1.8–8)
NEUTS SEG NFR BLD: 41 % (ref 32–75)
NRBC # BLD: 0 K/UL (ref 0–0.01)
NRBC BLD-RTO: 0 PER 100 WBC
PLATELET # BLD AUTO: 280 K/UL (ref 150–400)
PMV BLD AUTO: 10.4 FL (ref 8.9–12.9)
POTASSIUM SERPL-SCNC: 4.3 MMOL/L (ref 3.5–5.1)
PROT SERPL-MCNC: 7.8 G/DL (ref 6.4–8.2)
RBC # BLD AUTO: 5.36 M/UL (ref 4.1–5.7)
SODIUM SERPL-SCNC: 141 MMOL/L (ref 136–145)
TROPONIN I SERPL HS-MCNC: 4 NG/L (ref 0–76)
TROPONIN I SERPL HS-MCNC: 5 NG/L (ref 0–76)
WBC # BLD AUTO: 6.8 K/UL (ref 4.1–11.1)

## 2023-06-30 PROCEDURE — 71045 X-RAY EXAM CHEST 1 VIEW: CPT

## 2023-06-30 PROCEDURE — 80053 COMPREHEN METABOLIC PANEL: CPT

## 2023-06-30 PROCEDURE — 85379 FIBRIN DEGRADATION QUANT: CPT

## 2023-06-30 PROCEDURE — 99285 EMERGENCY DEPT VISIT HI MDM: CPT

## 2023-06-30 PROCEDURE — 84484 ASSAY OF TROPONIN QUANT: CPT

## 2023-06-30 PROCEDURE — 85025 COMPLETE CBC W/AUTO DIFF WBC: CPT

## 2023-06-30 PROCEDURE — 36415 COLL VENOUS BLD VENIPUNCTURE: CPT

## 2023-06-30 ASSESSMENT — ENCOUNTER SYMPTOMS
SORE THROAT: 0
ORTHOPNEA: 0
DIARRHEA: 0
SHORTNESS OF BREATH: 0
EYE PAIN: 0
RHINORRHEA: 0
BACK PAIN: 0
COUGH: 0
VOMITING: 0
HEARTBURN: 0
NAUSEA: 0
ABDOMINAL PAIN: 0
COLOR CHANGE: 0

## 2023-06-30 ASSESSMENT — LIFESTYLE VARIABLES
HOW MANY STANDARD DRINKS CONTAINING ALCOHOL DO YOU HAVE ON A TYPICAL DAY: PATIENT DOES NOT DRINK
HOW OFTEN DO YOU HAVE A DRINK CONTAINING ALCOHOL: NEVER

## 2023-10-06 NOTE — PROGRESS NOTES
MD Brando, FACP, [place], [place]      SHANTA Ramos, PCNP-BC   Alyssa Chau, [facility]-   Dasha Bailey, CLAUDIA Salgado FNBONIFACIO-MELISA Phillips, PCNP-BC   Nallely Tran, [facility]      [address]   at [facility]   [facility], [facility], [facility]   [phone]   FAX: [address]   at [facility], 8[address] [facility], [address]   [phone]   FAX: [phone]       Patient Care Team:  Odessa Randle MD as PCP - General (Endocrinology)      Patient Active Problem List   Diagnosis    Hypertension    Hypercholesterolemia    Neuropathy    GRAY (nonalcoholic steatohepatitis)    Type II diabetes mellitus ([facility])       Summer Garcia [facility] for management of Metabolic Associated Steatohepatitis (MASH) formerly called GRAY. The active problem list, all pertinent past medical history, medications, liver histology, radiologic findings and laboratory findings related to the liver disorder were reviewed and discussed with the patient. The patient is a 44 y.o. male who was found to have elevated liver transaminases and alkaline phosphatase in 12/2019. Serologic evaluation for markers of chronic liver disease was negative. Liver biopsy in 2/2022 demonstrates MASH with stage 2 fibrosis. Assessment of liver fibrosis with Fibroscan was performed in 10/2023. The result was 7.0 kPa which correlates with stage 1 fibrosis. The patient does not have any symptoms which could be attributed to the liver disorder. The patient is not experiencing the following symptoms which are commonly seen in this liver disorder:   fatigue,   pain in the right side over the liver.     The patient completes

## 2023-10-09 ENCOUNTER — OFFICE VISIT (OUTPATIENT)
Age: 43
End: 2023-10-09
Payer: COMMERCIAL

## 2023-10-09 VITALS
OXYGEN SATURATION: 100 % | SYSTOLIC BLOOD PRESSURE: 102 MMHG | DIASTOLIC BLOOD PRESSURE: 73 MMHG | HEART RATE: 68 BPM | TEMPERATURE: 97.8 F | BODY MASS INDEX: 26.19 KG/M2 | WEIGHT: 176.8 LBS | HEIGHT: 69 IN | RESPIRATION RATE: 16 BRPM

## 2023-10-09 DIAGNOSIS — K75.81 NASH (NONALCOHOLIC STEATOHEPATITIS): Primary | ICD-10-CM

## 2023-10-09 PROCEDURE — 3078F DIAST BP <80 MM HG: CPT | Performed by: NURSE PRACTITIONER

## 2023-10-09 PROCEDURE — 99214 OFFICE O/P EST MOD 30 MIN: CPT | Performed by: NURSE PRACTITIONER

## 2023-10-09 PROCEDURE — 3074F SYST BP LT 130 MM HG: CPT | Performed by: NURSE PRACTITIONER

## 2023-10-09 PROCEDURE — 91200 LIVER ELASTOGRAPHY: CPT | Performed by: NURSE PRACTITIONER

## 2023-10-09 RX ORDER — ACYCLOVIR 400 MG/1
TABLET ORAL
COMMUNITY
Start: 2023-09-28

## 2023-10-09 ASSESSMENT — PATIENT HEALTH QUESTIONNAIRE - PHQ9
SUM OF ALL RESPONSES TO PHQ QUESTIONS 1-9: 0
1. LITTLE INTEREST OR PLEASURE IN DOING THINGS: 0
SUM OF ALL RESPONSES TO PHQ QUESTIONS 1-9: 0
SUM OF ALL RESPONSES TO PHQ9 QUESTIONS 1 & 2: 0
SUM OF ALL RESPONSES TO PHQ QUESTIONS 1-9: 0
2. FEELING DOWN, DEPRESSED OR HOPELESS: 0
SUM OF ALL RESPONSES TO PHQ QUESTIONS 1-9: 0

## 2023-11-02 ENCOUNTER — HOSPITAL ENCOUNTER (EMERGENCY)
Facility: HOSPITAL | Age: 43
Discharge: HOME | End: 2023-11-02
Attending: EMERGENCY MEDICINE
Payer: COMMERCIAL

## 2023-11-02 VITALS
HEART RATE: 85 BPM | HEIGHT: 68 IN | DIASTOLIC BLOOD PRESSURE: 75 MMHG | OXYGEN SATURATION: 99 % | WEIGHT: 179.23 LBS | RESPIRATION RATE: 14 BRPM | SYSTOLIC BLOOD PRESSURE: 125 MMHG | TEMPERATURE: 97.9 F | BODY MASS INDEX: 27.16 KG/M2

## 2023-11-02 DIAGNOSIS — M79.2 NEUROPATHIC PAIN OF LEFT THIGH: ICD-10-CM

## 2023-11-02 DIAGNOSIS — G62.9 NEUROPATHY: Primary | ICD-10-CM

## 2023-11-02 PROCEDURE — 99283 EMERGENCY DEPT VISIT LOW MDM: CPT

## 2023-11-02 RX ORDER — GABAPENTIN 300 MG/1
300 CAPSULE ORAL 4 TIMES DAILY
Qty: 120 CAPSULE | Refills: 0 | Status: SHIPPED | OUTPATIENT
Start: 2023-11-02 | End: 2023-12-02

## 2023-11-02 ASSESSMENT — PAIN DESCRIPTION - DESCRIPTORS: DESCRIPTORS: BURNING

## 2023-11-02 ASSESSMENT — PAIN DESCRIPTION - LOCATION: LOCATION: LEG

## 2023-11-02 ASSESSMENT — PAIN SCALES - GENERAL: PAINLEVEL_OUTOF10: 5

## 2023-11-02 ASSESSMENT — PAIN DESCRIPTION - ORIENTATION: ORIENTATION: LEFT

## 2023-11-02 NOTE — ED NOTES

## 2023-11-02 NOTE — ED TRIAGE NOTES
Triage: pt arrives to the ER unaccompanied for c/o constant \"burning\" sensation from left hip radiating to knee x3 weeks ago. Pt seen at [facility] x3 weeks ago and given referral to neurologist. Denies injury or fall. Pt called several neurologists group but not able to be seen until May 2024.

## 2024-08-29 ENCOUNTER — CLINICAL DOCUMENTATION (OUTPATIENT)
Age: 44
End: 2024-08-29

## 2024-08-29 NOTE — PROGRESS NOTES
From 7/2024  AST/ALT/ALP/T Bili/ALB:  24/35/146/0.5/4.1  WBC/HB/PLT/INR:  5.9/15.6/239/x  NA/BUN/CREAT:  134/14/0.89    A1C% 13.6

## 2024-10-09 ENCOUNTER — OFFICE VISIT (OUTPATIENT)
Age: 44
End: 2024-10-09
Payer: COMMERCIAL

## 2024-10-09 VITALS
BODY MASS INDEX: 26.64 KG/M2 | TEMPERATURE: 97.3 F | WEIGHT: 175.8 LBS | HEIGHT: 68 IN | DIASTOLIC BLOOD PRESSURE: 81 MMHG | HEART RATE: 78 BPM | OXYGEN SATURATION: 99 % | SYSTOLIC BLOOD PRESSURE: 122 MMHG

## 2024-10-09 DIAGNOSIS — K75.81 NASH (NONALCOHOLIC STEATOHEPATITIS): Primary | ICD-10-CM

## 2024-10-09 PROCEDURE — 3074F SYST BP LT 130 MM HG: CPT | Performed by: NURSE PRACTITIONER

## 2024-10-09 PROCEDURE — 91200 LIVER ELASTOGRAPHY: CPT | Performed by: NURSE PRACTITIONER

## 2024-10-09 PROCEDURE — 3079F DIAST BP 80-89 MM HG: CPT | Performed by: NURSE PRACTITIONER

## 2024-10-09 PROCEDURE — 99214 OFFICE O/P EST MOD 30 MIN: CPT | Performed by: NURSE PRACTITIONER

## 2024-10-09 RX ORDER — LANCETS 30 GAUGE
EACH MISCELLANEOUS
COMMUNITY
Start: 2024-10-02

## 2024-10-09 RX ORDER — TIRZEPATIDE 2.5 MG/.5ML
2.5 INJECTION, SOLUTION SUBCUTANEOUS WEEKLY
COMMUNITY

## 2024-10-09 ASSESSMENT — ANXIETY QUESTIONNAIRES
5. BEING SO RESTLESS THAT IT IS HARD TO SIT STILL: NOT AT ALL
6. BECOMING EASILY ANNOYED OR IRRITABLE: NOT AT ALL
IF YOU CHECKED OFF ANY PROBLEMS ON THIS QUESTIONNAIRE, HOW DIFFICULT HAVE THESE PROBLEMS MADE IT FOR YOU TO DO YOUR WORK, TAKE CARE OF THINGS AT HOME, OR GET ALONG WITH OTHER PEOPLE: NOT DIFFICULT AT ALL
GAD7 TOTAL SCORE: 0
1. FEELING NERVOUS, ANXIOUS, OR ON EDGE: NOT AT ALL
7. FEELING AFRAID AS IF SOMETHING AWFUL MIGHT HAPPEN: NOT AT ALL
4. TROUBLE RELAXING: NOT AT ALL
3. WORRYING TOO MUCH ABOUT DIFFERENT THINGS: NOT AT ALL
2. NOT BEING ABLE TO STOP OR CONTROL WORRYING: NOT AT ALL

## 2024-10-09 ASSESSMENT — PATIENT HEALTH QUESTIONNAIRE - PHQ9
SUM OF ALL RESPONSES TO PHQ QUESTIONS 1-9: 0
SUM OF ALL RESPONSES TO PHQ QUESTIONS 1-9: 0
SUM OF ALL RESPONSES TO PHQ9 QUESTIONS 1 & 2: 0
SUM OF ALL RESPONSES TO PHQ QUESTIONS 1-9: 0
1. LITTLE INTEREST OR PLEASURE IN DOING THINGS: NOT AT ALL
DEPRESSION UNABLE TO ASSESS: FUNCTIONAL CAPACITY MOTIVATION LIMITS ACCURACY
2. FEELING DOWN, DEPRESSED OR HOPELESS: NOT AT ALL
SUM OF ALL RESPONSES TO PHQ QUESTIONS 1-9: 0

## 2024-10-09 NOTE — PROGRESS NOTES
Chief Complaint   Patient presents with    Follow-up     N/A     Vitals:    10/09/24 0815   BP: 122/81   Site: Right Upper Arm   Position: Sitting   Pulse: 78   Temp: 97.3 °F (36.3 °C)   TempSrc: Temporal   SpO2: 99%   Weight: 79.7 kg (175 lb 12.8 oz)   Height: 1.72 m (5' 7.72\")     .  \"Have you been to the ER, urgent care clinic since your last visit?  Hospitalized since your last visit?\"    NO    “Have you seen or consulted any other health care providers outside of [facility] since your last visit?”    NO          Click Here for Release of Records Request    
fibrosis.  KALE (221).    10/2022.  FibroScan performed at [facility]. EkPa was 7.0.  IQR/med 3%.  .  The results suggested a fibrosis level of F1.  The CAP score suggests there is no significant hepatic steatosis.    10/2023.  FibroScan performed at [facility]. EkPa was 5.1.  IQR/med 23%.  .   The results suggested a fibrosis level of F0.  The CAP score suggests there is no significant hepatic steatosis.    10/2024.  FibroScan performed at [facility]. EkPa was 4.6.  IQR/med 32%.  .   The results suggested a fibrosis level of F0. The CAP score suggests there is no significant hepatic steatosis.    ENDOSCOPIC PROCEDURES:  Not available or performed    RADIOLOGY:  Not available or performed    OTHER TESTING:  Not available or performed    FOLLOW-UP:  All of the issues listed above in the Assessment and Plan were discussed with the patient.  All questions were answered.  The patient expressed a clear understanding of the above.    No follow-up at [facility] is needed.  I would be glad to see the patient back for follow-up at any time in the future if the clinical situation changes.      Janice Escalante, CLEMENTE-ROBERTO  [address], [address]  [place]  [address] [phone]  [facility]

## (undated) DEVICE — MAX-CORE® DISPOSABLE CORE BIOPSY INSTRUMENT, 16G X 16CM: Brand: MAX-CORE

## (undated) DEVICE — TRAY BX SFT TISS W/ RUL ALC PREP PD FEN DRP TWL LUERLOCK